# Patient Record
(demographics unavailable — no encounter records)

---

## 2025-07-28 NOTE — COUNSELING
[Potential consequences of obesity discussed] : Potential consequences of obesity discussed [Benefits of weight loss discussed] : Benefits of weight loss discussed [Structured Weight Management Program suggested:] : Structured weight management program suggested [Encouraged to increase physical activity] : Encouraged to increase physical activity [Encouraged to use exercise tracking device] : Encouraged to use exercise tracking device [Target Wt Loss Goal ___] : Weight Loss Goals: Target weight loss goal [unfilled] lbs [Weigh Self Weekly] : weigh self weekly [Decrease Portions] : decrease portions [____ min/wk Activity] : [unfilled] min/wk activity [Keep Food Diary] : keep food diary [None] : None [Good understanding] : Patient has a good understanding of lifestyle changes and steps needed to achieve self management goal [Fall prevention counseling provided] : Fall prevention counseling provided [Adequate lighting] : Adequate lighting [No throw rugs] : No throw rugs [Use proper foot wear] : Use proper foot wear [Use recommended devices] : Use recommended devices [Behavioral health counseling provided] : Behavioral health counseling provided [Sleep ___ hours/day] : Sleep [unfilled] hours/day [Engage in a relaxing activity] : Engage in a relaxing activity [Plan in advance] : Plan in advance [FreeTextEntry4] : 15 [de-identified] : Annual Wellness Visit Summary: During a 40-minute face-to-face consultation, more than 50% of the time was devoted to counseling, laboratory/test review, and coordination of medical care. The annual wellness aspects covered in the visit include:  Completion and review of the Health Risk Assessment (HRA). Update of medical, family, and surgical history. Review and update of the list of current healthcare providers. Discussion on vitals and BMI, with an emphasis on achieving healthy BMI goals. Dietary counseling provided for 15 minutes. Completion and review of the Depression PHQ-9. Review of annual safety assessments and discussion on advance directives. Provision of smoking cessation counseling. Establishment of routine screening and immunization schedules. Vaccination and Treatment Recommendations:  Administration of pneumococcal vaccines (Pneumovax once after 65 and Prevnar). Annual influenza vaccine. Hepatitis B vaccine series. Zostavax for shingles prevention. Tetanus, diphtheria, and pertussis (Tdap) vaccine. Discussion on routine vaccination and the relevant schedules. Screening Recommendations:  Colorectal cancer screening through colonoscopy every 10 years or flexible sigmoidoscopy every 5 years, along with annual fecal occult testing. Biennial Bone Mineral Density (BMD) screening for osteoporosis. Annual glaucoma screenings and ophthalmological evaluations. Cardiovascular health screening and cholesterol monitoring with related dietary counseling. Once-off screening for Abdominal Aortic Aneurysm (AAA). Nutrition and Lifestyle Counseling:  Emphasis on a low-salt, low-fat diet adherent to the American Diabetes Association (ADA) guidelines; discussion on diabetes management and blood glucose monitoring. Regular physical activity recommendation. Dietary changes including limit intake of sodium to less than 2 grams a day, avoidance of fried foods, red meats, and other high cholesterol foods. Use of canola or olive oil instead of less healthy fats. Advance Care Planning:  Engagement in advance care planning, discussing the importance and personal influences on medical decisions. Identification of a healthcare agent and review or completion of an advance directive. COVID-19 Protocols:  Detailed instructions for symptomatic and asymptomatic patients concerning testing and self-quarantine measures. Specific guidelines for handling cases with direct or unclear exposure to COVID-19, including mandatory reporting to the [facility] registry. This thorough annual review aims to address comprehensive health aspects ensuring preventive care, optimal disease management, and preparedness for potential health issues.

## 2025-07-28 NOTE — ASSESSMENT
[Vaccines Reviewed] : Immunizations reviewed today. Please see immunization details in the vaccine log within the immunization flowsheet.  [FreeTextEntry1] : -Medical Annual wellness visit completed: -General Lab ordered waiting for results discussed with patient  -HRA completed and reviewed with patient -Medical, family, surgical history reviewed with patient and updated -List of current providers r/w patient and updated -Vitals, BMI reviewed and discussed along with healthy BMI goals. Dietary counseling x 15 minutes provided -Depression PHQ 9 completed and reviewed -Annual safety assessment reviewed -discussed advanced directives  -Established routine screening and immunization schedule  Time spent 42 minutes including counseling, charting, face-to-face encounter, chart review

## 2025-07-28 NOTE — HEALTH RISK ASSESSMENT
[Good] : ~his/her~  mood as  good [No] : No [Never (0 pts)] : Never (0 points) [No falls in past year] : Patient reported no falls in the past year [0] : 2) Feeling down, depressed, or hopeless: Not at all (0) [PHQ-2 Negative - No further assessment needed] : PHQ-2 Negative - No further assessment needed [Time Spent: ___ Minutes] : I spent [unfilled] minutes performing a depression screening for this patient. [HIV test declined] : HIV test declined [Hepatitis C test declined] : Hepatitis C test declined [With Family] : lives with family [Single] : single [Fully functional (bathing, dressing, toileting, transferring, walking, feeding)] : Fully functional (bathing, dressing, toileting, transferring, walking, feeding) [Fully functional (using the telephone, shopping, preparing meals, housekeeping, doing laundry, using] : Fully functional and needs no help or supervision to perform IADLs (using the telephone, shopping, preparing meals, housekeeping, doing laundry, using transportation, managing medications and managing finances) [With Patient/Caregiver] : , with patient/caregiver [Aggressive treatment] : aggressive treatment [I will adhere to the patient's wishes.] : I will adhere to the patient's wishes. [Time Spent: ___ minutes] : Time Spent: [unfilled] minutes [Never] : Never [NO] : No [Name: ___] : Health Care Proxy's Name: [unfilled]  [Relationship: ___] : Relationship: [unfilled] [Audit-CScore] : 0 [Change in mental status noted] : No change in mental status noted [Language] : denies difficulty with language [Behavior] : denies difficulty with behavior [Learning/Retaining New Information] : denies difficulty learning/retaining new information [Handling Complex Tasks] : denies difficulty handling complex tasks [Reasoning] : denies difficulty with reasoning [Spatial Ability and Orientation] : denies difficulty with spatial ability and orientation [Reports changes in hearing] : Reports no changes in hearing [Reports changes in vision] : Reports no changes in vision [Reports normal functional visual acuity (ie: able to read med bottle)] : Reports poor functional visual acuity.  [AdvancecareDate] : 07/2025 [FreeTextEntry4] : [phone]  Met with NATY FLORENCE, who was willing to discuss advance care planning.  Our advance care planning conversation included a discussion about:  1. The value and importance of advance care planning.  2. Experiences with loved ones who have been seriously ill or have .  3. Exploration of personal, cultural, or spiritual beliefs that might influence medical decisions.  4. Exploration of goals of care in the event of a sudden injury or illness.  5. Identification of a health care agent.  6. Review and update, or completion of, an advance directive.  Start time: ____________                    End time: ____________

## 2025-07-28 NOTE — HISTORY OF PRESENT ILLNESS
[FreeTextEntry1] : Establish care AWV [de-identified] :  19-year-old male presents for annual visit, check-up with internal Medicine, [facility]. Establish care  Patient reports being in overall good health with no significant complaints.   cc: has had  PMhx: No significant Past medical History Medications: no Medications Allergies: NKDA   Denies fever, cough, chills, body aches and sob.